# Patient Record
Sex: FEMALE | Race: WHITE | NOT HISPANIC OR LATINO | URBAN - METROPOLITAN AREA
[De-identification: names, ages, dates, MRNs, and addresses within clinical notes are randomized per-mention and may not be internally consistent; named-entity substitution may affect disease eponyms.]

---

## 2021-08-28 ENCOUNTER — TELEPHONE ENCOUNTER (OUTPATIENT)
Dept: URBAN - METROPOLITAN AREA CLINIC 13 | Facility: CLINIC | Age: 41
End: 2021-08-28

## 2021-08-29 ENCOUNTER — TELEPHONE ENCOUNTER (OUTPATIENT)
Dept: URBAN - METROPOLITAN AREA CLINIC 13 | Facility: CLINIC | Age: 41
End: 2021-08-29

## 2022-04-28 ENCOUNTER — CLAIMS CREATED FROM THE CLAIM WINDOW (OUTPATIENT)
Dept: URBAN - METROPOLITAN AREA CLINIC 46 | Facility: CLINIC | Age: 42
End: 2022-04-28
Payer: COMMERCIAL

## 2022-04-28 ENCOUNTER — DASHBOARD ENCOUNTERS (OUTPATIENT)
Age: 42
End: 2022-04-28

## 2022-04-28 VITALS
HEART RATE: 82 BPM | WEIGHT: 172.2 LBS | DIASTOLIC BLOOD PRESSURE: 85 MMHG | SYSTOLIC BLOOD PRESSURE: 139 MMHG | BODY MASS INDEX: 34.72 KG/M2 | HEIGHT: 59 IN | TEMPERATURE: 98.4 F

## 2022-04-28 DIAGNOSIS — R19.8 CHANGE IN BOWEL FUNCTION: ICD-10-CM

## 2022-04-28 DIAGNOSIS — R14.0 BLOATING: ICD-10-CM

## 2022-04-28 DIAGNOSIS — R10.9 ABDOMINAL CRAMPING: ICD-10-CM

## 2022-04-28 DIAGNOSIS — R10.84 ABDOMINAL CRAMPING, GENERALIZED: ICD-10-CM

## 2022-04-28 PROBLEM — 88111009: Status: ACTIVE | Noted: 2022-04-28

## 2022-04-28 PROCEDURE — 99204 OFFICE O/P NEW MOD 45 MIN: CPT | Performed by: INTERNAL MEDICINE

## 2022-04-28 RX ORDER — RIFAXIMIN 550 MG/1
1 TABLET TABLET ORAL THREE TIMES A DAY
Qty: 42 TABLET | Refills: 0 | OUTPATIENT
Start: 2022-04-28 | End: 2022-05-12

## 2022-04-28 RX ORDER — DICYCLOMINE HYDROCHLORIDE 10 MG/1
1 TABLET CAPSULE ORAL
Qty: 40 | Refills: 3 | OUTPATIENT
Start: 2022-04-28 | End: 2022-06-07

## 2022-04-28 RX ORDER — GABAPENTIN 100 MG/1
2 CAPSULE CAPSULE ORAL ONCE A DAY
Status: ACTIVE | COMMUNITY

## 2022-04-28 RX ORDER — OMEGA-3/DHA/EPA/FISH OIL 120-180 MG
1 CAPSULE CAPSULE ORAL ONCE A DAY
Status: ACTIVE | COMMUNITY

## 2022-04-28 NOTE — HPI-TODAY'S VISIT:
Pt with a hx of chronic GERD wtih EGD in 2015 with gastritis; concern about esophageal motility study after and an esophageal manometry showed reflux disease and no motility issues. Pt not seen since 2015 and now presents with 6 months of progressive GI distress: severe bloating and abdominal distention even when fasting; intermittent abdominal cramping without triggers; intermittent diarrhea without bleeding or nocturnal attacks. PCP told her she has a leaky gut. She has a hx of fibromyalgia but takes infrequent NSAIDS. Has no weight loss but does have frequent joint swelling.

## 2022-04-28 NOTE — PHYSICAL EXAM GASTROINTESTINAL
Abdomen , soft, diffusely tender, nondistended , no guarding or rigidity , no masses palpable , normal bowel sounds , Liver and Spleen , no hepatomegaly present , no hepatosplenomegaly , liver nontender , spleen not palpable

## 2022-05-04 ENCOUNTER — TELEPHONE ENCOUNTER (OUTPATIENT)
Dept: URBAN - METROPOLITAN AREA CLINIC 46 | Facility: CLINIC | Age: 42
End: 2022-05-04

## 2022-05-13 PROBLEM — 247330004: Status: ACTIVE | Noted: 2022-04-28

## 2022-05-17 ENCOUNTER — ERX REFILL RESPONSE (OUTPATIENT)
Dept: URBAN - METROPOLITAN AREA CLINIC 46 | Facility: CLINIC | Age: 42
End: 2022-05-17

## 2022-05-17 RX ORDER — RIFAXIMIN 550 MG/1
TAKE 1 TABLET BY MOUTH THREE TIMES DAILY FOR 14 DAYS TABLET ORAL
Qty: 42 TABLET | Refills: 1 | OUTPATIENT

## 2022-06-01 ENCOUNTER — OFFICE VISIT (OUTPATIENT)
Dept: URBAN - METROPOLITAN AREA SURGERY CENTER 28 | Facility: SURGERY CENTER | Age: 42
End: 2022-06-01
Payer: COMMERCIAL

## 2022-06-01 ENCOUNTER — TELEPHONE ENCOUNTER (OUTPATIENT)
Dept: URBAN - METROPOLITAN AREA CLINIC 46 | Facility: CLINIC | Age: 42
End: 2022-06-01

## 2022-06-01 ENCOUNTER — CLAIMS CREATED FROM THE CLAIM WINDOW (OUTPATIENT)
Dept: URBAN - METROPOLITAN AREA CLINIC 4 | Facility: CLINIC | Age: 42
End: 2022-06-01
Payer: COMMERCIAL

## 2022-06-01 DIAGNOSIS — K63.89 OTHER SPECIFIED DISEASES OF INTESTINE: ICD-10-CM

## 2022-06-01 DIAGNOSIS — R19.7 ACUTE DIARRHEA: ICD-10-CM

## 2022-06-01 DIAGNOSIS — K63.89 BACTERIAL OVERGROWTH SYNDROME: ICD-10-CM

## 2022-06-01 DIAGNOSIS — K29.70 CHRONIC ACITVE GASTRITIS (H.PYLORI NEGATIVE): ICD-10-CM

## 2022-06-01 DIAGNOSIS — K29.70 GASTRITIS, UNSPECIFIED, WITHOUT BLEEDING: ICD-10-CM

## 2022-06-01 DIAGNOSIS — R10.84 ABDOMINAL CRAMPING, GENERALIZED: ICD-10-CM

## 2022-06-01 DIAGNOSIS — K21.9 ACID REFLUX: ICD-10-CM

## 2022-06-01 DIAGNOSIS — K21.9 GASTRO-ESOPHAGEAL REFLUX DISEASE WITHOUT ESOPHAGITIS: ICD-10-CM

## 2022-06-01 DIAGNOSIS — R10.13 ABDOMINAL DISCOMFORT, EPIGASTRIC: ICD-10-CM

## 2022-06-01 DIAGNOSIS — K31.89 OTHER DISEASES OF STOMACH AND DUODENUM: ICD-10-CM

## 2022-06-01 DIAGNOSIS — K31.89 ACQUIRED DEFORMITY OF DUODENUM: ICD-10-CM

## 2022-06-01 PROCEDURE — 43239 EGD BIOPSY SINGLE/MULTIPLE: CPT | Performed by: INTERNAL MEDICINE

## 2022-06-01 PROCEDURE — 45380 COLONOSCOPY AND BIOPSY: CPT | Performed by: INTERNAL MEDICINE

## 2022-06-01 PROCEDURE — 88312 SPECIAL STAINS GROUP 1: CPT | Performed by: PATHOLOGY

## 2022-06-01 PROCEDURE — 88305 TISSUE EXAM BY PATHOLOGIST: CPT | Performed by: PATHOLOGY

## 2022-06-01 PROCEDURE — G8907 PT DOC NO EVENTS ON DISCHARG: HCPCS | Performed by: INTERNAL MEDICINE

## 2022-06-01 RX ORDER — SUCRALFATE 1 G/10ML
10 ML ON AN EMPTY STOMACH SUSPENSION ORAL THREE TIMES A DAY
Qty: 300 ML | Refills: 1 | OUTPATIENT
Start: 2022-06-02 | End: 2022-06-22

## 2022-06-01 RX ORDER — OMEGA-3/DHA/EPA/FISH OIL 120-180 MG
1 CAPSULE CAPSULE ORAL ONCE A DAY
Status: ACTIVE | COMMUNITY

## 2022-06-01 RX ORDER — RIFAXIMIN 550 MG/1
TAKE 1 TABLET BY MOUTH THREE TIMES DAILY FOR 14 DAYS TABLET ORAL
Qty: 42 TABLET | Refills: 1 | Status: ACTIVE | COMMUNITY

## 2022-06-01 RX ORDER — GABAPENTIN 100 MG/1
2 CAPSULE CAPSULE ORAL ONCE A DAY
Status: ACTIVE | COMMUNITY

## 2022-06-01 RX ORDER — DICYCLOMINE HYDROCHLORIDE 10 MG/1
1 TABLET CAPSULE ORAL
Qty: 40 | Refills: 3 | Status: ACTIVE | COMMUNITY
Start: 2022-04-28 | End: 2022-06-07